# Patient Record
Sex: FEMALE | Race: WHITE | NOT HISPANIC OR LATINO | Employment: OTHER | URBAN - METROPOLITAN AREA
[De-identification: names, ages, dates, MRNs, and addresses within clinical notes are randomized per-mention and may not be internally consistent; named-entity substitution may affect disease eponyms.]

---

## 2017-01-18 ENCOUNTER — ALLSCRIPTS OFFICE VISIT (OUTPATIENT)
Dept: OTHER | Facility: OTHER | Age: 67
End: 2017-01-18

## 2017-02-02 RX ORDER — GLUCOSAMINE HCL 500 MG
TABLET ORAL
COMMUNITY

## 2017-02-03 ENCOUNTER — ANESTHESIA EVENT (OUTPATIENT)
Dept: PERIOP | Facility: HOSPITAL | Age: 67
End: 2017-02-03
Payer: MEDICARE

## 2017-02-04 ENCOUNTER — GENERIC CONVERSION - ENCOUNTER (OUTPATIENT)
Dept: OTHER | Facility: OTHER | Age: 67
End: 2017-02-04

## 2017-02-04 ENCOUNTER — ANESTHESIA (OUTPATIENT)
Dept: PERIOP | Facility: HOSPITAL | Age: 67
End: 2017-02-04
Payer: MEDICARE

## 2017-02-04 ENCOUNTER — HOSPITAL ENCOUNTER (OUTPATIENT)
Facility: HOSPITAL | Age: 67
Setting detail: OUTPATIENT SURGERY
Discharge: HOME | End: 2017-02-04
Attending: INTERNAL MEDICINE | Admitting: INTERNAL MEDICINE
Payer: MEDICARE

## 2017-02-04 VITALS
HEART RATE: 67 BPM | WEIGHT: 136.91 LBS | TEMPERATURE: 98.1 F | OXYGEN SATURATION: 100 % | BODY MASS INDEX: 24.26 KG/M2 | HEIGHT: 63 IN | SYSTOLIC BLOOD PRESSURE: 137 MMHG | RESPIRATION RATE: 18 BRPM | DIASTOLIC BLOOD PRESSURE: 60 MMHG

## 2017-02-04 DIAGNOSIS — K21.9 GERD (GASTROESOPHAGEAL REFLUX DISEASE): ICD-10-CM

## 2017-02-04 DIAGNOSIS — Z86.010 HISTORY OF COLONIC POLYPS: ICD-10-CM

## 2017-02-04 DIAGNOSIS — R10.84 GENERALIZED ABDOMINAL PAIN: ICD-10-CM

## 2017-02-04 PROCEDURE — 88342 IMHCHEM/IMCYTCHM 1ST ANTB: CPT | Performed by: INTERNAL MEDICINE

## 2017-02-04 PROCEDURE — 88305 TISSUE EXAM BY PATHOLOGIST: CPT | Performed by: INTERNAL MEDICINE

## 2017-02-04 RX ORDER — LIDOCAINE HYDROCHLORIDE 10 MG/ML
INJECTION, SOLUTION INFILTRATION; PERINEURAL AS NEEDED
Status: DISCONTINUED | OUTPATIENT
Start: 2017-02-04 | End: 2017-02-04 | Stop reason: SURG

## 2017-02-04 RX ORDER — SODIUM CHLORIDE, SODIUM LACTATE, POTASSIUM CHLORIDE, CALCIUM CHLORIDE 600; 310; 30; 20 MG/100ML; MG/100ML; MG/100ML; MG/100ML
100 INJECTION, SOLUTION INTRAVENOUS CONTINUOUS
Status: DISCONTINUED | OUTPATIENT
Start: 2017-02-04 | End: 2017-02-04 | Stop reason: HOSPADM

## 2017-02-04 RX ORDER — PROPOFOL 10 MG/ML
INJECTION, EMULSION INTRAVENOUS AS NEEDED
Status: DISCONTINUED | OUTPATIENT
Start: 2017-02-04 | End: 2017-02-04 | Stop reason: SURG

## 2017-02-04 RX ADMIN — PROPOFOL 120 MG: 10 INJECTION, EMULSION INTRAVENOUS at 10:17

## 2017-02-04 RX ADMIN — SODIUM CHLORIDE, SODIUM LACTATE, POTASSIUM CHLORIDE, AND CALCIUM CHLORIDE: .6; .31; .03; .02 INJECTION, SOLUTION INTRAVENOUS at 10:15

## 2017-02-04 RX ADMIN — SODIUM CHLORIDE, SODIUM LACTATE, POTASSIUM CHLORIDE, AND CALCIUM CHLORIDE 100 ML/HR: .6; .31; .03; .02 INJECTION, SOLUTION INTRAVENOUS at 09:33

## 2017-02-04 RX ADMIN — PROPOFOL 50 MG: 10 INJECTION, EMULSION INTRAVENOUS at 10:24

## 2017-02-04 RX ADMIN — PROPOFOL 50 MG: 10 INJECTION, EMULSION INTRAVENOUS at 10:30

## 2017-02-04 RX ADMIN — LIDOCAINE HYDROCHLORIDE 5 ML: 10 INJECTION, SOLUTION INFILTRATION; PERINEURAL at 10:17

## 2017-02-08 ENCOUNTER — GENERIC CONVERSION - ENCOUNTER (OUTPATIENT)
Dept: OTHER | Facility: OTHER | Age: 67
End: 2017-02-08

## 2017-02-21 ENCOUNTER — ALLSCRIPTS OFFICE VISIT (OUTPATIENT)
Dept: OTHER | Facility: OTHER | Age: 67
End: 2017-02-21

## 2017-02-21 ENCOUNTER — APPOINTMENT (OUTPATIENT)
Dept: LAB | Facility: CLINIC | Age: 67
End: 2017-02-21
Payer: MEDICARE

## 2017-02-21 ENCOUNTER — TRANSCRIBE ORDERS (OUTPATIENT)
Dept: LAB | Facility: CLINIC | Age: 67
End: 2017-02-21

## 2017-02-21 DIAGNOSIS — Z00.00 ROUTINE GENERAL MEDICAL EXAMINATION AT A HEALTH CARE FACILITY: Primary | ICD-10-CM

## 2017-02-21 DIAGNOSIS — Z00.00 ROUTINE GENERAL MEDICAL EXAMINATION AT A HEALTH CARE FACILITY: ICD-10-CM

## 2017-02-21 PROCEDURE — 86803 HEPATITIS C AB TEST: CPT

## 2017-02-21 PROCEDURE — 36415 COLL VENOUS BLD VENIPUNCTURE: CPT

## 2017-02-22 LAB — HCV AB SER QL: NORMAL

## 2017-02-23 ENCOUNTER — GENERIC CONVERSION - ENCOUNTER (OUTPATIENT)
Dept: OTHER | Facility: OTHER | Age: 67
End: 2017-02-23

## 2017-08-03 ENCOUNTER — HOSPITAL ENCOUNTER (OUTPATIENT)
Dept: CT IMAGING | Facility: HOSPITAL | Age: 67
Discharge: HOME | End: 2017-08-03
Payer: MEDICARE

## 2017-08-03 ENCOUNTER — ALLSCRIPTS OFFICE VISIT (OUTPATIENT)
Dept: OTHER | Facility: OTHER | Age: 67
End: 2017-08-03

## 2017-08-03 ENCOUNTER — APPOINTMENT (OUTPATIENT)
Dept: LAB | Facility: HOSPITAL | Age: 67
End: 2017-08-03
Payer: MEDICARE

## 2017-08-03 ENCOUNTER — TRANSCRIBE ORDERS (OUTPATIENT)
Dept: ADMINISTRATIVE | Facility: HOSPITAL | Age: 67
End: 2017-08-03

## 2017-08-03 DIAGNOSIS — R10.31 RIGHT LOWER QUADRANT PAIN: ICD-10-CM

## 2017-08-03 LAB
BASOPHILS # BLD AUTO: 0.06 THOUSANDS/ΜL (ref 0–0.1)
BASOPHILS NFR BLD AUTO: 1 % (ref 0–1)
EOSINOPHIL # BLD AUTO: 0.14 THOUSAND/ΜL (ref 0–0.61)
EOSINOPHIL NFR BLD AUTO: 2 % (ref 0–6)
ERYTHROCYTE [DISTWIDTH] IN BLOOD BY AUTOMATED COUNT: 12.6 % (ref 11.6–15.1)
HCT VFR BLD AUTO: 38.9 % (ref 34.8–46.1)
HGB BLD-MCNC: 12.8 G/DL (ref 11.5–15.4)
LYMPHOCYTES # BLD AUTO: 2.82 THOUSANDS/ΜL (ref 0.6–4.47)
LYMPHOCYTES NFR BLD AUTO: 36 % (ref 14–44)
MCH RBC QN AUTO: 29.2 PG (ref 26.8–34.3)
MCHC RBC AUTO-ENTMCNC: 32.9 G/DL (ref 31.4–37.4)
MCV RBC AUTO: 89 FL (ref 82–98)
MONOCYTES # BLD AUTO: 0.47 THOUSAND/ΜL (ref 0.17–1.22)
MONOCYTES NFR BLD AUTO: 6 % (ref 4–12)
NEUTROPHILS # BLD AUTO: 4.26 THOUSANDS/ΜL (ref 1.85–7.62)
NEUTS SEG NFR BLD AUTO: 55 % (ref 43–75)
NRBC BLD AUTO-RTO: 0 /100 WBCS
PLATELET # BLD AUTO: 305 THOUSANDS/UL (ref 149–390)
PMV BLD AUTO: 9.4 FL (ref 8.9–12.7)
RBC # BLD AUTO: 4.39 MILLION/UL (ref 3.81–5.12)
WBC # BLD AUTO: 7.77 THOUSAND/UL (ref 4.31–10.16)

## 2017-08-03 PROCEDURE — 74176 CT ABD & PELVIS W/O CONTRAST: CPT

## 2017-08-03 PROCEDURE — 85025 COMPLETE CBC W/AUTO DIFF WBC: CPT

## 2017-08-03 PROCEDURE — 36415 COLL VENOUS BLD VENIPUNCTURE: CPT

## 2017-08-03 RX ADMIN — IOHEXOL 50 ML: 240 INJECTION, SOLUTION INTRATHECAL; INTRAVASCULAR; INTRAVENOUS; ORAL at 18:17

## 2017-08-04 ENCOUNTER — GENERIC CONVERSION - ENCOUNTER (OUTPATIENT)
Dept: OTHER | Facility: OTHER | Age: 67
End: 2017-08-04

## 2017-08-24 ENCOUNTER — ALLSCRIPTS OFFICE VISIT (OUTPATIENT)
Dept: OTHER | Facility: OTHER | Age: 67
End: 2017-08-24

## 2018-01-11 NOTE — RESULT NOTES
Verified Results  (1) TISSUE EXAM [id number] 10:24AM Juana Moses     Test Name Result Flag Reference   LAB AP CASE REPORT (Report)     Surgical Pathology Report             Case: [id number]                   Authorizing Provider: Jodie Bowles MD  Collected:      02/04/2017 1024        Ordering Location:   [address] [place] [address] Received:      02/04/2017 1346                    Operating Room                                 Pathologist:      Janis Shane DO                               Specimens:  A) - Duodenum, Cold bx                                         B) - Stomach, Cold bx                                         C) - Colon, Colon cold bx   LAB AP FINAL DIAGNOSIS (Report)     A  Duodenum, biopsy:  - Duodenal mucosa with no significant pathologic abnormalities  - No villous atrophy or increased intraepithelial lymphocytes identified  B  Stomach, biopsy:  - Minimal chronic inactive antral-oxyntic gastritis  - No H  pylori organisms identified on H&E and immunohistochemical stains  C  Colon, biopsy:  - Colonic mucosa with no significant pathologic abnormalities  - No changes of chronic, active or microscopic colitis identified    - No dysplasia identified  Appropriate positive and negative controls obtained  Interpretation performed at [facility], [place] [address]  Electronically signed by Janis Shane DO on 2/7/2017 at 10:57 AM   LAB AP SURGICAL ADDITIONAL INFORMATION (Report)     These tests were developed and their performance characteristics   determined by [facility] or [facility]  They may not be cleared or approved by the [place]  Food and   Drug Administration  The FDA has determined that such clearance or   approval is not necessary  These tests are used for clinical purposes  They should not be regarded as investigational or for research   This   laboratory has been approved by CLIA 88, designated as a high-complexity   laboratory and is qualified to perform these tests  LAB AP GROSS DESCRIPTION (Report)     A  The specimen is received in formalin, labeled with the patient's name   and hospital number, and is designated duodenum cold biopsy, are three   irregularly shaped fragments of tan soft tissue measuring 0 4 x 0 4 x 0 1   cm in aggregate  Entirely submitted  One cassette  B  The specimen is received in formalin, labeled with the patient's name   and hospital number, and is designated stomach cold biopsy, are three   irregularly shaped fragments of tan soft tissue measuring 0 6 x 0 5 x 0 cm   in aggregate  Entirely submitted  One cassette  C  The specimen is received in formalin, labeled with the patient's name   and hospital number, and is designated colon cold biopsy, are two   irregularly shaped fragments of tan soft tissue each measuring 0 3 cm in   greatest dimension  Entirely submitted  One cassette  Note: The estimated total formalin fixation time based upon information   provided by the submitting clinician and the standard processing schedule   is 51 5 hours        RLR   LAB AP CLINICAL INFORMATION R/o celiac     R/o celiac

## 2018-01-13 VITALS
WEIGHT: 132 LBS | DIASTOLIC BLOOD PRESSURE: 70 MMHG | BODY MASS INDEX: 23.39 KG/M2 | HEIGHT: 63 IN | SYSTOLIC BLOOD PRESSURE: 136 MMHG

## 2018-01-13 VITALS
HEIGHT: 63 IN | WEIGHT: 140.38 LBS | BODY MASS INDEX: 24.88 KG/M2 | SYSTOLIC BLOOD PRESSURE: 118 MMHG | DIASTOLIC BLOOD PRESSURE: 72 MMHG

## 2018-01-13 VITALS — WEIGHT: 142 LBS | BODY MASS INDEX: 25.15 KG/M2

## 2018-01-14 VITALS
BODY MASS INDEX: 23.57 KG/M2 | HEIGHT: 63 IN | SYSTOLIC BLOOD PRESSURE: 122 MMHG | WEIGHT: 133 LBS | DIASTOLIC BLOOD PRESSURE: 80 MMHG

## 2018-01-14 NOTE — RESULT NOTES
Verified Results  (1) CBC/PLT/DIFF 03Aug2017 04:41PM Leann Schultz Carry Order Number: [id number]     Test Name Result Flag Reference   WBC COUNT 7 77 Thousand/uL  4 31-10 16   RBC COUNT 4 39 Million/uL  3 81-5 12   HEMOGLOBIN 12 8 g/dL  11 5-15 4   HEMATOCRIT 38 9 %  34 8-46  1   MCV 89 fL  82-98   MCH 29 2 pg  26 8-34 3   MCHC 32 9 g/dL  31 4-37 4   RDW 12 6 %  11 6-15 1   MPV 9 4 fL  8 9-12 7   PLATELET COUNT 525 Thousands/uL  149-390   nRBC AUTOMATED 0 /100 WBCs     NEUTROPHILS RELATIVE PERCENT 55 %  43-75   LYMPHOCYTES RELATIVE PERCENT 36 %  14-44   MONOCYTES RELATIVE PERCENT 6 %  4-12   EOSINOPHILS RELATIVE PERCENT 2 %  0-6   BASOPHILS RELATIVE PERCENT 1 %  0-1   NEUTROPHILS ABSOLUTE COUNT 4 26 Thousands/? ??L  1 85-7 62   LYMPHOCYTES ABSOLUTE COUNT 2 82 Thousands/? ??L  0 60-4 47   MONOCYTES ABSOLUTE COUNT 0 47 Thousand/? ??L  0 17-1 22   EOSINOPHILS ABSOLUTE COUNT 0 14 Thousand/? ??L  0 00-0 61   BASOPHILS ABSOLUTE COUNT 0 06 Thousands/? ??L  0 00-0 10     * CT ABDOMEN PELVIS WO CONTRAST 03Aug2017 04:30PM Leann Schultz Carry Order Number: [id number]   Performing Comments: oral contrast   stat please call [phone] with results   - Patient Instructions: To schedule this appointment, please contact Central Scheduling at [phone]  Test Name Result Flag Reference   CT ABDOMEN PELVIS WO CONTRAST (Report)     CT ABDOMEN AND PELVIS WITHOUT IV CONTRAST     INDICATION: Right lower quadrant pain     COMPARISON: 11/30/2016     TECHNIQUE: CT examination of the abdomen and pelvis was performed without intravenous contrast  Reformatted images were created in axial, sagittal, and coronal planes  Radiation dose length product (DLP) for this visit: 520 mGy-cm   This examination, like all CT scans performed in the [facility], was performed utilizing techniques to minimize radiation dose exposure, including the use of iterative    reconstruction and automated exposure control  Enteric contrast was administered  FINDINGS:     ABDOMEN     LOWER CHEST: No significant abnormalities identified in the lower chest      LIVER/BILIARY TREE: Unremarkable  GALLBLADDER: No calcified gallstones  No pericholecystic inflammatory change  SPLEEN: Unremarkable  PANCREAS: Unremarkable  ADRENAL GLANDS: Unremarkable  KIDNEYS/URETERS: Tiny nonobstructing right renal calculi  No hydronephrosis  STOMACH AND BOWEL: Although limited without intravenous contrast and adequate distention, there appears to be mild wall thickening of the right colon with adjacent fat stranding, suggestive of a mild colitis  APPENDIX: No findings to suggest appendicitis  ABDOMINOPELVIC CAVITY: No ascites or free intraperitoneal air  No lymphadenopathy  VESSELS: Unremarkable for patient's age  PELVIS     REPRODUCTIVE ORGANS: Hysterectomy  URINARY BLADDER: Unremarkable  ABDOMINAL WALL/INGUINAL REGIONS: Unremarkable  OSSEOUS STRUCTURES: No acute fracture or destructive osseous lesion  IMPRESSION:   1  Although limited without intravenous contrast and adequate distention, there appears to be mild wall thickening of the right colon with adjacent fat stranding, suggestive of a mild colitis  Clinical correlation recommended               Workstation performed: [id number]     Signed by:   Brenda Garcia MD   8/3/17       Plan  Diverticulitis of colon    · Amoxicillin-Pot Clavulanate 875-125 MG Oral Tablet; TAKE 1 TABLET EVERY 12  HOURS UNTIL GONE   · MetroNIDAZOLE 500 MG Oral Tablet; TAKE 1 TABLET 3 TIMES DAILY UNTIL  GONE  Diverticulitis of colon,     · Ondansetron HCl - 4 MG Oral Tablet; TAKE 1 TABLET Every 8 hours PRN nausea

## 2018-03-10 ENCOUNTER — HOSPITAL ENCOUNTER (EMERGENCY)
Facility: HOSPITAL | Age: 68
Discharge: HOME | End: 2018-03-10
Attending: EMERGENCY MEDICINE | Admitting: EMERGENCY MEDICINE
Payer: MEDICARE

## 2018-03-10 ENCOUNTER — APPOINTMENT (EMERGENCY)
Dept: RADIOLOGY | Facility: HOSPITAL | Age: 68
End: 2018-03-10
Payer: MEDICARE

## 2018-03-10 VITALS
TEMPERATURE: 98.5 F | HEART RATE: 91 BPM | DIASTOLIC BLOOD PRESSURE: 69 MMHG | OXYGEN SATURATION: 100 % | RESPIRATION RATE: 18 BRPM | SYSTOLIC BLOOD PRESSURE: 155 MMHG

## 2018-03-10 DIAGNOSIS — R07.9 CHEST PAIN AT REST: Primary | ICD-10-CM

## 2018-03-10 LAB
ALBUMIN SERPL BCP-MCNC: 4.1 G/DL (ref 3.5–5)
ALP SERPL-CCNC: 58 U/L (ref 46–116)
ALT SERPL W P-5'-P-CCNC: 28 U/L (ref 12–78)
ANION GAP SERPL CALCULATED.3IONS-SCNC: 9 MMOL/L (ref 4–13)
AST SERPL W P-5'-P-CCNC: 13 U/L (ref 5–45)
BASOPHILS # BLD AUTO: 0.05 THOUSANDS/ΜL (ref 0–0.1)
BASOPHILS NFR BLD AUTO: 1 % (ref 0–1)
BILIRUB DIRECT SERPL-MCNC: 0.08 MG/DL (ref 0–0.2)
BILIRUB SERPL-MCNC: 0.3 MG/DL (ref 0.2–1)
BUN SERPL-MCNC: 13 MG/DL (ref 5–25)
CALCIUM SERPL-MCNC: 10.2 MG/DL (ref 8.3–10.1)
CHLORIDE SERPL-SCNC: 102 MMOL/L (ref 100–108)
CO2 SERPL-SCNC: 26 MMOL/L (ref 21–32)
CREAT SERPL-MCNC: 0.65 MG/DL (ref 0.6–1.3)
EOSINOPHIL # BLD AUTO: 0.07 THOUSAND/ΜL (ref 0–0.61)
EOSINOPHIL NFR BLD AUTO: 1 % (ref 0–6)
ERYTHROCYTE [DISTWIDTH] IN BLOOD BY AUTOMATED COUNT: 12.7 % (ref 11.6–15.1)
GFR SERPL CREATININE-BSD FRML MDRD: 92 ML/MIN/1.73SQ M
GLUCOSE SERPL-MCNC: 106 MG/DL (ref 65–140)
HCT VFR BLD AUTO: 37.6 % (ref 34.8–46.1)
HGB BLD-MCNC: 12.7 G/DL (ref 11.5–15.4)
LIPASE SERPL-CCNC: 117 U/L (ref 73–393)
LYMPHOCYTES # BLD AUTO: 1.96 THOUSANDS/ΜL (ref 0.6–4.47)
LYMPHOCYTES NFR BLD AUTO: 28 % (ref 14–44)
MAGNESIUM SERPL-MCNC: 2 MG/DL (ref 1.6–2.6)
MCH RBC QN AUTO: 29.1 PG (ref 26.8–34.3)
MCHC RBC AUTO-ENTMCNC: 33.8 G/DL (ref 31.4–37.4)
MCV RBC AUTO: 86 FL (ref 82–98)
MONOCYTES # BLD AUTO: 0.35 THOUSAND/ΜL (ref 0.17–1.22)
MONOCYTES NFR BLD AUTO: 5 % (ref 4–12)
NEUTROPHILS # BLD AUTO: 4.55 THOUSANDS/ΜL (ref 1.85–7.62)
NEUTS SEG NFR BLD AUTO: 65 % (ref 43–75)
NRBC BLD AUTO-RTO: 0 /100 WBCS
PLATELET # BLD AUTO: 307 THOUSANDS/UL (ref 149–390)
PMV BLD AUTO: 9.2 FL (ref 8.9–12.7)
POTASSIUM SERPL-SCNC: 4 MMOL/L (ref 3.5–5.3)
PROT SERPL-MCNC: 7.5 G/DL (ref 6.4–8.2)
RBC # BLD AUTO: 4.36 MILLION/UL (ref 3.81–5.12)
SODIUM SERPL-SCNC: 137 MMOL/L (ref 136–145)
TROPONIN I SERPL-MCNC: <0.02 NG/ML
WBC # BLD AUTO: 6.99 THOUSAND/UL (ref 4.31–10.16)

## 2018-03-10 PROCEDURE — 84484 ASSAY OF TROPONIN QUANT: CPT | Performed by: EMERGENCY MEDICINE

## 2018-03-10 PROCEDURE — 83690 ASSAY OF LIPASE: CPT | Performed by: EMERGENCY MEDICINE

## 2018-03-10 PROCEDURE — 99285 EMERGENCY DEPT VISIT HI MDM: CPT

## 2018-03-10 PROCEDURE — 83735 ASSAY OF MAGNESIUM: CPT | Performed by: EMERGENCY MEDICINE

## 2018-03-10 PROCEDURE — 36415 COLL VENOUS BLD VENIPUNCTURE: CPT | Performed by: EMERGENCY MEDICINE

## 2018-03-10 PROCEDURE — 93005 ELECTROCARDIOGRAM TRACING: CPT

## 2018-03-10 PROCEDURE — 85025 COMPLETE CBC W/AUTO DIFF WBC: CPT | Performed by: EMERGENCY MEDICINE

## 2018-03-10 PROCEDURE — 80048 BASIC METABOLIC PNL TOTAL CA: CPT | Performed by: EMERGENCY MEDICINE

## 2018-03-10 PROCEDURE — 80076 HEPATIC FUNCTION PANEL: CPT | Performed by: EMERGENCY MEDICINE

## 2018-03-10 PROCEDURE — 71046 X-RAY EXAM CHEST 2 VIEWS: CPT

## 2018-03-11 NOTE — ED PROVIDER NOTES
History  Chief Complaint   Patient presents with    Chest Pain     radiation to back and left shoulder  pain in the center of her chest as well especially with eating  pt reports nauseas after eating as well  HPI    Prior to Admission Medications   Prescriptions Last Dose Informant Patient Reported? Taking? Biotin 1 MG CAPS   Yes No   Sig: Take by mouth   Cholecalciferol (VITAMIN D3) 3000 UNITS TABS   Yes No   Sig: Take by mouth   Cyanocobalamin (VITAMIN B 12 PO)   Yes No   Sig: Take by mouth   Flax OIL   Yes No   Sig: Take by mouth   ascorbic acid (VITAMIN C) 500 mg tablet   Yes No   Sig: Take 500 mg by mouth daily   dicyclomine (BENTYL) 10 mg capsule   No No   Sig: Take 1 capsule by mouth every 6 (six) hours as needed (abd cramping ) for up to 30 days   omeprazole (PriLOSEC) 20 mg delayed release capsule   Yes No   Sig: Take 20 mg by mouth daily   ondansetron (ZOFRAN-ODT) 4 mg disintegrating tablet   No No   Sig: Take 1 tablet by mouth every 8 (eight) hours as needed for nausea or vomiting      Facility-Administered Medications: None       Past Medical History:   Diagnosis Date    GERD (gastroesophageal reflux disease)        Past Surgical History:   Procedure Laterality Date    CARPAL TUNNEL RELEASE      FRACTURE SURGERY      right hand    HYSTERECTOMY      MS ESOPHAGOGASTRODUODENOSCOPY TRANSORAL DIAGNOSTIC N/A 2/4/2017    Procedure: EGD AND COLONOSCOPY;  Surgeon: Manley Babinski, MD;  Location: [facility]; Service: Gastroenterology    THYROID LOBECTOMY         History reviewed  No pertinent family history  I have reviewed and agree with the history as documented      Social History   Substance Use Topics    Smoking status: Former Smoker    Smokeless tobacco: Not on file    Alcohol use No        Review of Systems    Physical Exam  ED Triage Vitals   Temperature Pulse Respirations Blood Pressure SpO2   03/10/18 1807 03/10/18 1807 03/10/18 1807 03/10/18 1809 03/10/18 1807   98 5 °F (36 9 °C) 91 18 155/69 100 %      Temp Source Heart Rate Source Patient Position - Orthostatic VS BP Location FiO2 (%)   03/10/18 1807 03/10/18 1807 03/10/18 1809 03/10/18 1809 --   Oral Monitor Lying Left arm       Pain Score       --                  Orthostatic Vital Signs  Vitals:    03/10/18 1807 03/10/18 1809   BP:  155/69   Pulse: 91    Patient Position - Orthostatic VS:  Lying       Physical Exam   Constitutional: She is oriented to person, place, and time  She appears well-developed and well-nourished  No distress  HENT:   Head: Normocephalic and atraumatic  Mouth/Throat: Oropharynx is clear and moist    Eyes: Conjunctivae are normal  Pupils are equal, round, and reactive to light  Neck: Normal range of motion  No tracheal deviation present  Cardiovascular: Normal rate, regular rhythm, normal heart sounds and intact distal pulses  Pulmonary/Chest: Effort normal and breath sounds normal  No respiratory distress  She exhibits no tenderness  Abdominal: Soft  Bowel sounds are normal  She exhibits no distension  There is no tenderness  Neurological: She is alert and oriented to person, place, and time  She has normal strength  GCS eye subscore is 4  GCS verbal subscore is 5  GCS motor subscore is 6  Skin: Skin is warm and dry  Psychiatric: She has a normal mood and affect  Her behavior is normal    Nursing note and vitals reviewed        ED Medications  Medications - No data to display    Diagnostic Studies  Results Reviewed     Procedure Component Value Units Date/Time    Troponin I [[id number]]  (Normal) Collected:  03/10/18 2002    Lab Status:  Final result Specimen:  Blood from Arm, Left Updated:  03/10/18 2031     Troponin I <0 02 ng/mL     Narrative:         [employer] Chemistry analyzer 99% cutoff is > 0 04 ng/mL in network labs    o cTnI 99% cutoff is useful only when applied to patients in the clinical setting of myocardial ischemia  o cTnI 99% cutoff should be interpreted in the context of clinical history, ECG findings and possibly cardiac imaging to establish correct diagnosis  o cTnI 99% cutoff may be suggestive but clearly not indicative of a coronary event without the clinical setting of myocardial ischemia  Basic metabolic panel [[id number]]  (Abnormal) Collected:  03/10/18 2002    Lab Status:  Final result Specimen:  Blood from Arm, Left Updated:  03/10/18 2030     Sodium 137 mmol/L      Potassium 4 0 mmol/L      Chloride 102 mmol/L      CO2 26 mmol/L      Anion Gap 9 mmol/L      BUN 13 mg/dL      Creatinine 0 65 mg/dL      Glucose 106 mg/dL      Calcium 10 2 (H) mg/dL      eGFR 92 ml/min/1 73sq m     Narrative:         National Kidney Disease Education Program recommendations are as follows:  GFR calculation is accurate only with a steady state creatinine  Chronic Kidney disease less than 60 ml/min/1 73 sq  meters  Kidney failure less than 15 ml/min/1 73 sq  meters      Hepatic function panel [[id number]]  (Normal) Collected:  03/10/18 2002    Lab Status:  Final result Specimen:  Blood from Arm, Left Updated:  03/10/18 2030     Total Bilirubin 0 30 mg/dL      Bilirubin, Direct 0 08 mg/dL      Alkaline Phosphatase 58 U/L      AST 13 U/L      ALT 28 U/L      Total Protein 7 5 g/dL      Albumin 4 1 g/dL     Lipase [[id number]]  (Normal) Collected:  03/10/18 2002    Lab Status:  Final result Specimen:  Blood from Arm, Left Updated:  03/10/18 2030     Lipase 117 u/L     Magnesium [[id number]]  (Normal) Collected:  03/10/18 2002    Lab Status:  Final result Specimen:  Blood from Arm, Left Updated:  03/10/18 2030     Magnesium 2 0 mg/dL     CBC and differential [[id number]]  (Normal) Collected:  03/10/18 2002    Lab Status:  Final result Specimen:  Blood from Arm, Left Updated:  03/10/18 2008     WBC 6 99 Thousand/uL      RBC 4 36 Million/uL      Hemoglobin 12 7 g/dL      Hematocrit 37 6 %      MCV 86 fL      MCH 29 1 pg      MCHC 33 8 g/dL      RDW 12 7 %      MPV 9 2 fL      Platelets 371 Thousands/uL nRBC 0 /100 WBCs      Neutrophils Relative 65 %      Lymphocytes Relative 28 %      Monocytes Relative 5 %      Eosinophils Relative 1 %      Basophils Relative 1 %      Neutrophils Absolute 4 55 Thousands/µL      Lymphocytes Absolute 1 96 Thousands/µL      Monocytes Absolute 0 35 Thousand/µL      Eosinophils Absolute 0 07 Thousand/µL      Basophils Absolute 0 05 Thousands/µL                  XR chest 2 views   Final Result by Abdoulaye Joyce MD (03/11 0936)      1  No acute cardiopulmonary disease  2   Asymmetric nodular density in the right lower lobe, likely a nipple shadow  A follow-up radiograph with nipple markers is recommended for confirmation  Additional finding and recommendation were called to the Emergency Department following final interpretation              Workstation performed: [id number]                    Procedures  ECG 12 Lead Documentation  Date/Time: 3/10/2018 7:10 PM  Performed by: Ayan Whitmore  Authorized by: Ayan Whitmore     Indications / Diagnosis:  CP  ECG reviewed by me, the ED Provider: yes    Patient location:  ED  Previous ECG:     Previous ECG:  Unavailable  Interpretation:     Interpretation: normal    Rate:     ECG rate:  74    ECG rate assessment: normal    Rhythm:     Rhythm: sinus rhythm    Ectopy:     Ectopy: none    QRS:     QRS axis:  Normal    QRS intervals:  Normal  Conduction:     Conduction: normal    ST segments:     ST segments:  Normal  T waves:     T waves: normal             Phone Contacts  ED Phone Contact    ED Course  ED Course          HEART Risk Score    Flowsheet Row Most Recent Value   History  0 Filed at: 03/12/2018 1551   ECG  0 Filed at: 03/12/2018 1551   Age  2 Filed at: 03/12/2018 1551   Risk Factors  0 Filed at: 03/12/2018 1551   Troponin  0 Filed at: 03/12/2018 1551   Heart Score Risk Calculator   History  0 Filed at: 03/12/2018 1551   ECG  0 Filed at: 03/12/2018 1551   Age  2 Filed at: 03/12/2018 1551 Risk Factors  0 Filed at: 03/12/2018 1551   Troponin  0 Filed at: 03/12/2018 1551   HEART Score  2 Filed at: 03/12/2018 1551   HEART Score  2 Filed at: 03/12/2018 1551                            MDM  Number of Diagnoses or Management Options  Chest pain at rest: new and requires workup  Diagnosis management comments: This is a 49-year-old female who presents here today with "chest pain" intermittently for a week  She states it starts underneath her left breast and is a "numbness" type pain  This occurs and it feels like her heart is "pounding out of her chest" and she can occasionally hear it pounding in her ears, and can see her heartbeat in her eyes  She states occasionally it briefly feels like it is going too fast, but most this last for couple of seconds  She denies any slow heart rate, skipped beats, or other irregularities in her heartbeat  She denies any associated shortness of breath with this  She then feels like she has a "numb" feeling in her left upper arm and upper back, what she describes as a pins and needles sensation  She never has a spontaneously, but only associated with the sensation in her chest and her heavy heartbeat  She states that occasionally after eating she becomes nauseous  She denies any known triggers for her symptoms, but states it happens spontaneously, frequently at rest   When it happens, she will get up and paced around, which usually helps her symptoms  She denies any triggers from exertions, positions, movements, eating, breathing  She denies any recent fevers, cough, congestion, vomiting, other complaints  She does have a history of GERD for which she takes a daily PPI  She has no prior ulcers, problems with her pancreas or gallbladder  She has no underlying problems with COPD, asthma, ACS  She denies any chronic medical problems  She did see her primary care doctor on 3/5, but because he did not have power in his office he could not do an EKG    He did order a chest x-ray, but could not get the result because he was out of power this week  However, he told her that it sounded like she had "fluid" in the base of her left lung so started her on amoxicillin for possible pneumonia  However, she denies any fevers or other infectious symptoms  She does states that several years ago she had similar symptoms, and was diagnosed with pneumonia, and that her symptoms resolved with antibiotics  She has no other prior similar symptoms  ROS: Otherwise negative, unless stated as above  She is well-appearing, in no acute distress  Her exam is unremarkable  She is low risk for ACS, her story is very atypical for this, especially as her pain improves with walking around  There could be underlying pneumonia, which would be inappropriately treated with amoxicillin for community-acquired pneumonia  Given her history of reflux this could be a component to her current pain  Given has it is more of a paresthesia that she is having as opposed to true pain, there could be a component of muscular pain, with possible neuropathy or nerve irritation would there could be an underlying component of anxiety  Suspicion for PE or pneumothorax is much lower  We will get a chest x-ray here, and check lab work to evaluate  Her chest x-ray shows no acute abnormalities  Her lab work is unremarkable  The patient has had no recurrence of pain since being here  I discussed Ozzie uncertain exact etiology, treatment at home, close follow-up with her primary care doctor, and indications for return, and she expresses understanding with this plan         Amount and/or Complexity of Data Reviewed  Clinical lab tests: reviewed and ordered  Tests in the radiology section of CPT®: reviewed and ordered  Independent visualization of images, tracings, or specimens: yes      CritCare Time    Disposition  Final diagnoses:   Chest pain at rest     Time reflects when diagnosis was documented in both MDM as applicable and the Disposition within this note     Time User Action Codes Description Comment    3/10/2018  9:17 PM Davie Muse Add [R07 9] Chest pain at rest       ED Disposition     ED Disposition Condition Comment    Discharge  Mariana Duobis discharge to home/self care  Condition at discharge: Good        Follow-up Information     Follow up With Specialties Details Why Contact Info    Glil Villa MD Internal Medicine Schedule an appointment as soon as possible for a visit in 3 days to follow up on your symptoms [facility]  [address]  [address] [place] [address]  [phone]          Discharge Medication List as of 3/10/2018  9:18 PM      CONTINUE these medications which have NOT CHANGED    Details   ascorbic acid (VITAMIN C) 500 mg tablet Take 500 mg by mouth daily, Until Discontinued, Historical Med      Biotin 1 MG CAPS Take by mouth, Until Discontinued, Historical Med      Cholecalciferol (VITAMIN D3) 3000 UNITS TABS Take by mouth, Until Discontinued, Historical Med      Cyanocobalamin (VITAMIN B 12 PO) Take by mouth, Until Discontinued, Historical Med      dicyclomine (BENTYL) 10 mg capsule Take 1 capsule by mouth every 6 (six) hours as needed (abd cramping ) for up to 30 days, Starting 11/30/2016, Until Fri 12/30/16, Print      Flax OIL Take by mouth, Until Discontinued, Historical Med      omeprazole (PriLOSEC) 20 mg delayed release capsule Take 20 mg by mouth daily, Until Discontinued, Historical Med      ondansetron (ZOFRAN-ODT) 4 mg disintegrating tablet Take 1 tablet by mouth every 8 (eight) hours as needed for nausea or vomiting, Starting 11/30/2016, Until Discontinued, Print           No discharge procedures on file      ED Provider  Electronically Signed by           Jomar Gary MD  03/12/18 8453

## 2018-03-11 NOTE — DISCHARGE INSTRUCTIONS
Take your medications as prescribed  Keep track of when you are having symptoms, and what might be triggering them  Follow up closely with your primary care doctor for further evaluation of your symptoms  Return for worsening or changing symptoms, or for any other concerns  Thoracic Pain, Ambulatory Care   GENERAL INFORMATION:   Thoracic pain  is discomfort in any area between your neck and your abdomen  Thoracic pain may be caused by health conditions that affect your gastrointestinal system, lungs, bones, or muscles  It can also be caused by trauma, panic attacks, or anxiety related to stress  Seek immediate care for the following symptoms:   · Pain that gets worse or lasts longer than 5 minutes    · Angina (pressure or squeezing chest pain) that does not get better when you take your usual angina medicine     · Pain with shortness of breath, sweating, dizziness, vomiting, or nausea    · Pain that spreads to your arm, neck, back, jaw, or stomach  Treatment for thoracic pain  may include any of the following:  · Acetaminophen  decreases pain  It is available without a prescription  Ask how much to take and how often to take it  Follow directions  Acetaminophen can cause liver damage if not taken correctly  · NSAIDs  help decrease swelling and pain or fever  This medicine is available with or without a doctor's order  NSAIDs can cause stomach bleeding or kidney problems in certain people  If you take blood thinner medicine, always ask if NSAIDs are safe for you  Always read the medicine label and follow directions  Do not give these medicines to children under 10months of age without direction from your child's doctor  Manage your symptoms:   · Apply heat to the area  Heat helps decrease pain and muscle spasms  Apply heat on the area for 20 to 30 minutes every 2 hours for as many days as directed  · Limit physical activity that causes pain  Rest as needed   Ask your healthcare provider how long you should limit activity  Follow up with your healthcare provider as directed:  Write down your questions so you remember to ask them during your visits  CARE AGREEMENT:   You have the right to help plan your care  Learn about your health condition and how it may be treated  Discuss treatment options with your caregivers to decide what care you want to receive  You always have the right to refuse treatment  The above information is an  only  It is not intended as medical advice for individual conditions or treatments  Talk to your doctor, nurse or pharmacist before following any medical regimen to see if it is safe and effective for you  © 2014 [address] is for End User's use only and may not be sold, redistributed or otherwise used for commercial purposes  All illustrations and images included in CareNotes® are the copyrighted property of A [employer] M [employer]  or Aaron Khan

## 2018-03-12 LAB
ATRIAL RATE: 74 BPM
P AXIS: 68 DEGREES
PR INTERVAL: 128 MS
QRS AXIS: 68 DEGREES
QRSD INTERVAL: 94 MS
QT INTERVAL: 374 MS
QTC INTERVAL: 415 MS
T WAVE AXIS: 66 DEGREES
VENTRICULAR RATE: 74 BPM

## 2018-03-12 PROCEDURE — 93010 ELECTROCARDIOGRAM REPORT: CPT | Performed by: INTERNAL MEDICINE

## 2018-03-14 ENCOUNTER — OFFICE VISIT (OUTPATIENT)
Dept: GASTROENTEROLOGY | Facility: CLINIC | Age: 68
End: 2018-03-14
Payer: MEDICARE

## 2018-03-14 VITALS
SYSTOLIC BLOOD PRESSURE: 132 MMHG | WEIGHT: 131.38 LBS | DIASTOLIC BLOOD PRESSURE: 82 MMHG | BODY MASS INDEX: 23.27 KG/M2 | HEART RATE: 88 BPM

## 2018-03-14 DIAGNOSIS — K21.9 GERD WITHOUT ESOPHAGITIS: Primary | ICD-10-CM

## 2018-03-14 PROCEDURE — 99214 OFFICE O/P EST MOD 30 MIN: CPT | Performed by: INTERNAL MEDICINE

## 2018-03-14 RX ORDER — OMEPRAZOLE 20 MG/1
CAPSULE, DELAYED RELEASE ORAL
COMMUNITY
Start: 2017-02-04 | End: 2018-03-14

## 2018-03-14 NOTE — PROGRESS NOTES
Follow-up Note -  [facility]  Mariana Dubois 1950 79 y o  female     ASSESSMENT @ PLAN:   She is 54-year-old female with worsening of her acid reflux over the last week likely triggered by amoxicillin treatment  She has had nausea chest pain and a sour taste in her mouth  She had an emergency room visit with negative cardiovascular testing  She had endoscopy with me it was normal a year ago  1 she has gone back on the Prilosec  I told her to take it for 2 months  We went over the risks of chronic PPIs  Her she has recurrent peptic symptoms she will accept these risks and take it    2 she will do the Carafate q i d  for 1-2 weeks    3 I told her to do the MiraLax for symptomatic treatment of her constipation   Reason:  Abdominal pain and chest pain and reflux    HPI:  She is a 54-year-old female with intermittent peptic symptoms over the last week which was triggered with amoxicillin treatment  She had a chest x-ray which was negative  She had recurrent nausea and chest pain  She had an emergency room visit was negative  She then started to have a sour taste in her mouth  She is back on the Prilosec  She was also given Carafate by Dr torrez so  She had endoscopy with me in 2016 which was essentially normal   She went off of her Prilosec due to the chronic risk of PPIs  She has been off of it successfully for about 6 months  We did discuss these at length  She has no fevers chills dysphagia  She has no weight loss no melena  No excessive NSAID use  She has mild constipation as well  She had a colonoscopy at the same time of the endoscopy  REVIEW OF SYSTEMS:     CONSTITUTIONAL: Denies any fever, chills, or rigors  Good appetite, and no recent weight loss  HEENT: No earache or tinnitus  Denies hearing loss or visual disturbances  CARDIOVASCULAR: No chest pain or palpitations     RESPIRATORY: Denies any cough, hemoptysis, shortness of breath or dyspnea on exertion  GASTROINTESTINAL: As noted in the History of Present Illness  GENITOURINARY: No problems with urination  Denies any hematuria or dysuria  NEUROLOGIC: No dizziness or vertigo, denies headaches  MUSCULOSKELETAL: Denies any muscle or joint pain  SKIN: Denies skin rashes or itching  ENDOCRINE: Denies excessive thirst  Denies intolerance to heat or cold  PSYCHOSOCIAL: Denies depression or anxiety  Denies any recent memory loss  Past Medical History:   Diagnosis Date    GERD (gastroesophageal reflux disease)       Past Surgical History:   Procedure Laterality Date    CARPAL TUNNEL RELEASE      FRACTURE SURGERY      right hand    HYSTERECTOMY      AK ESOPHAGOGASTRODUODENOSCOPY TRANSORAL DIAGNOSTIC N/A 2/4/2017    Procedure: EGD AND COLONOSCOPY;  Surgeon: Emmanuel Tobar MD;  Location: [facility]; Service: Gastroenterology    THYROID LOBECTOMY       Social History     Social History    Marital status: Single     Spouse name: N/A    Number of children: N/A    Years of education: N/A     Occupational History    Not on file       Social History Main Topics    Smoking status: Former Smoker    Smokeless tobacco: Never Used    Alcohol use No    Drug use: No    Sexual activity: Not on file     Other Topics Concern    Not on file     Social History Narrative    No narrative on file     Family History   Problem Relation Age of Onset    Family history unknown: Yes     Decongestant [oxymetazoline]; Sulfa antibiotics; and Methylprednisolone  Current Outpatient Prescriptions   Medication Sig Dispense Refill    ascorbic acid (VITAMIN C) 500 mg tablet Take 500 mg by mouth daily      Biotin 1 MG CAPS Take by mouth      Cholecalciferol (VITAMIN D3) 3000 UNITS TABS Take by mouth      Cyanocobalamin (VITAMIN B 12 PO) Take by mouth      Flax OIL Take by mouth      dicyclomine (BENTYL) 10 mg capsule Take 1 capsule by mouth every 6 (six) hours as needed (abd cramping ) for up to 30 days 20 capsule 0    omeprazole (PriLOSEC) 20 mg delayed release capsule Take 20 mg by mouth daily      ondansetron (ZOFRAN-ODT) 4 mg disintegrating tablet Take 1 tablet by mouth every 8 (eight) hours as needed for nausea or vomiting 12 tablet 0     No current facility-administered medications for this visit  Blood pressure 132/82, pulse 88, weight 59 6 kg (131 lb 6 oz)  PHYSICAL EXAM:     General Appearance:   Alert, cooperative, no distress, appears stated age    HEENT:   Normocephalic, atraumatic, anicteric      Neck:  Supple, symmetrical, trachea midline, no adenopathy;    thyroid: no enlargement/tenderness/nodules; no carotid  bruit or JVD    Lungs:   Clear to auscultation bilaterally; no rales, rhonchi or wheezing; respirations unlabored    Heart[de-identified]   S1 and S2 normal; regular rate and rhythm; no murmur, rub, or gallop     Abdomen:   Soft, non-tender, non-distended; normal bowel sounds; no masses, no organomegaly    Genitalia:   Deferred    Rectal:   Deferred    Extremities:  No cyanosis, clubbing or edema    Pulses:  2+ and symmetric all extremities    Skin:  Skin color, texture, turgor normal, no rashes or lesions    Lymph nodes:  No palpable cervical, axillary or inguinal lymphadenopathy        Lab Results   Component Value Date    WBC 6 99 03/10/2018    HGB 12 7 03/10/2018    HCT 37 6 03/10/2018    MCV 86 03/10/2018     03/10/2018     Lab Results   Component Value Date    GLUCOSE 106 03/10/2018    CALCIUM 10 2 (H) 03/10/2018     03/10/2018    K 4 0 03/10/2018    CO2 26 03/10/2018     03/10/2018    BUN 13 03/10/2018    CREATININE 0 65 03/10/2018     Lab Results   Component Value Date    ALT 28 03/10/2018    AST 13 03/10/2018    ALKPHOS 58 03/10/2018    BILITOT 0 30 03/10/2018     Lab Results   Component Value Date    INR 0 98 11/30/2016    PROTIME 12 9 11/30/2016

## 2018-03-14 NOTE — LETTER
March 14, 2018     Gill Villa MD  [facility]  [address]  [address] Located within [facility]    Patient: Amber Craig   YOB: 1950   Date of Visit: 3/14/2018       Dear Dr Sharyle Amos: Thank you for referring Alexandra Gill to me for evaluation  Below are my notes for this consultation  If you have questions, please do not hesitate to call me  I look forward to following your patient along with you           Sincerely,        Luanne Duenas MD        CC: No Recipients

## 2020-05-06 NOTE — RESULT NOTES
Verified Results  (1) HEP C ANTIBODY [id number] 02:52PM Minna Schultz     Test Name Result Flag Reference   HEPATITIS C ANTIBODY Non-reactive  Non-reactive General